# Patient Record
Sex: MALE | Race: WHITE | Employment: FULL TIME | URBAN - METROPOLITAN AREA
[De-identification: names, ages, dates, MRNs, and addresses within clinical notes are randomized per-mention and may not be internally consistent; named-entity substitution may affect disease eponyms.]

---
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
## 2020-08-24 ENCOUNTER — HOSPITAL ENCOUNTER (EMERGENCY)
Age: 20
Discharge: HOME | End: 2020-08-24
Attending: EMERGENCY MEDICINE
Payer: COMMERCIAL

## 2020-08-24 VITALS
DIASTOLIC BLOOD PRESSURE: 86 MMHG | HEART RATE: 98 BPM | TEMPERATURE: 98 F | OXYGEN SATURATION: 97 % | RESPIRATION RATE: 16 BRPM | SYSTOLIC BLOOD PRESSURE: 116 MMHG

## 2020-08-24 PROCEDURE — 2500000003 HC RX 250 WO HCPCS: Performed by: EMERGENCY MEDICINE

## 2020-08-24 PROCEDURE — 12002 RPR S/N/AX/GEN/TRNK2.6-7.5CM: CPT

## 2020-08-24 PROCEDURE — 99282 EMERGENCY DEPT VISIT SF MDM: CPT

## 2020-08-24 RX ORDER — LIDOCAINE HYDROCHLORIDE 10 MG/ML
5 INJECTION, SOLUTION EPIDURAL; INFILTRATION; INTRACAUDAL; PERINEURAL ONCE
Status: COMPLETED | OUTPATIENT
Start: 2020-08-24 | End: 2020-08-24

## 2020-08-24 RX ADMIN — LIDOCAINE HYDROCHLORIDE 5 ML: 10 INJECTION, SOLUTION EPIDURAL; INFILTRATION; INTRACAUDAL; PERINEURAL at 11:35

## 2020-08-24 ASSESSMENT — PAIN DESCRIPTION - DESCRIPTORS: DESCRIPTORS: TENDER

## 2020-08-24 ASSESSMENT — PAIN SCALES - GENERAL: PAINLEVEL_OUTOF10: 3

## 2020-08-24 ASSESSMENT — PAIN DESCRIPTION - LOCATION: LOCATION: FINGER (COMMENT WHICH ONE)

## 2020-08-24 ASSESSMENT — PAIN DESCRIPTION - PAIN TYPE: TYPE: ACUTE PAIN

## 2020-08-24 ASSESSMENT — PAIN DESCRIPTION - FREQUENCY: FREQUENCY: CONTINUOUS

## 2020-08-24 ASSESSMENT — PAIN DESCRIPTION - ONSET: ONSET: GRADUAL

## 2020-08-24 ASSESSMENT — PAIN DESCRIPTION - PROGRESSION: CLINICAL_PROGRESSION: GRADUALLY WORSENING

## 2020-08-24 ASSESSMENT — PAIN DESCRIPTION - ORIENTATION: ORIENTATION: RIGHT

## 2020-08-24 NOTE — ED NOTES
Discharge and education instructions reviewed. Patient verbalized understanding, teach-back successful. Patient denied questions at this time. No acute distress noted. Patient instructed to follow-up as noted - return to emergency department if symptoms worsen. Patient verbalized understanding. Discharged per EDMD with discharge instructions.         Ebonie Sanz RN  08/24/20 6318

## 2020-08-24 NOTE — ED PROVIDER NOTES
[address]  Chief Complaint   Patient presents with    Laceration     pt states he was working and his finger slipped and cut his right pointer finger on sheet metal. pain 3/10     HISTORY OF PRESENT ILLNESS  Daniela Alva is a 23 y.o. male who presents to the ED complaining of laceration to the right index finger on the palmar aspect, transversely along the PIP. No other injuries noted. This happened at work earlier today just prior to arrival where a heavy piece of sheet metal that he was pulling off of a shelf slipped and cut his finger. He denies any other injuries or lacerations or involvement of the other fingers. He was wearing gloves at the time. His tetanus is up-to-date and he is right-hand dominant. He says it was bleeding a lot so he covered it right away and came to the ER for assessment. No other complaints, modifying factors or associated symptoms. Nursing notes reviewed. Past Medical History:   Diagnosis Date    Wrist fracture, left      History reviewed. No pertinent surgical history. History reviewed. No pertinent family history.   Social History     Socioeconomic History    Marital status: Single     Spouse name: Not on file    Number of children: Not on file    Years of education: Not on file    Highest education level: Not on file   Occupational History    Not on file   Social Needs    Financial resource strain: Not on file    Food insecurity     Worry: Not on file     Inability: Not on file    Transportation needs     Medical: Not on file     Non-medical: Not on file   Tobacco Use    Smoking status: Never Smoker    Smokeless tobacco: Never Used   Substance and Sexual Activity    Alcohol use: No     Alcohol/week: 0.0 standard drinks    Drug use: No    Sexual activity: Not on file   Lifestyle    Physical activity     Days per week: Not on file     Minutes per session: Not on file    Stress: Not on file Relationships    Social connections     Talks on phone: Not on file     Gets together: Not on file     Attends [employer] service: Not on file     Active member of club or organization: Not on file     Attends meetings of clubs or organizations: Not on file     Relationship status: Not on file    Intimate partner violence     Fear of current or ex partner: Not on file     Emotionally abused: Not on file     Physically abused: Not on file     Forced sexual activity: Not on file   Other Topics Concern    Not on file   Social History Narrative    Not on file     No current facility-administered medications for this encounter. No current outpatient medications on file. No Known Allergies    REVIEW OF SYSTEMS  6 systems reviewed, pertinent positives per HPI otherwise noted to be negative    PHYSICAL EXAM   /86   Pulse 98   Temp 98 °F (36.7 °C) (Oral)   Resp 16   SpO2 97%    GENERAL APPEARANCE: Awake and alert. Cooperative. No acute distress. HEAD: Normocephalic. Atraumatic. EYES: PERRL. EOM's grossly intact. ENT: Mucous membranes are moist.   NECK: Supple. Normal ROM. CHEST: Equal symmetric chest rise. LUNGS: Breathing is unlabored. Speaking comfortably in full sentences. ABDOMEN: Nondistended, nontender  MUSCULOSKELETAL:  RUE: 3cm transversely oriented linear laceration noted over the palmar aspect of the PIP joint of the second finger. Despite this laceration is full flexion and extension strength when isolated DIP and PIP and MCP joints are evaluated. He has similarly normal motor exams in the other fingers. Distal 2nd fingertip sensation is intact prior to digital block. No other RUE ttp or lacerations. 2+ radial pulse. Brisk cap refill x5 digits. Sensation and motor function fully intact in the radial, ulnar, and median nerve distribution. Full range of motion of all major joints. Cardinal movements of hand fully intact. No erythema, bruising, or other lacerations. Comparments are soft. LUE:  No tenderness. 2+ radial pulse. Brisk cap refill x5 digits. Sensation and motor function fully intact in the radial, ulnar, and median nerve distribution. Full range of motion of all major joints. Cardinal movements of hand fully intact. No erythema, bruising, or lacerations. Comparments are soft. SKIN: Warm and dry. No acute rashes. NEUROLOGICAL: Alert and oriented. Strength is 5/5 in all extremities and sensation is intact. ED COURSE/MDM  The patient's ED course was notable for R index finger laceration. Tetanus is up to date. No signs of tendon injury or distal neurovascular compromise or sensorimotor dysfunction. Finger splint was applied after wound was cleaned and closed. No indication for x-ray at this time or prophylactic antibiotics. Signs and symptoms of infection were discussed and to return to ED if they occurred. No other injuries noted. Patient referred to [facility] for [employer]. as well as hand surgery for wound reevaluation and follow-up. Laceration Repair Procedure Note    Indication: Laceration(s)    Consent: The patient was counseled regarding the procedure, it's indications, risks, potential complications and alternatives and any questions were answered. Consent was obtained. Location: R index finger near PIP joint on palmar side    Shape: linear    Procedure: The patient was placed in the appropriate position and anesthesia around the laceration was obtained by infiltration using 1% Lidocaine without epinephrine. The area was then cleaned with betadine and draped in a sterile fashion. The wound was then cleaned with betadine and irrigated with saline. The wound was fully explored in a bloodless field and no foreign bodies were found. The laceration was closed with 4-0 Ethilon using interrupted sutures. There were no additional lacerations requiring repair. .    The wound area was then dressed with a bandage and finger splint applied    Total repaired wound length: 3 cm. For reference, laceration length cut-offs for billing purposes are  by lengths of 0-2.5cm, 2.6-5cm, 5.1-7.5cm, and 7.6-12.5cm. Wound classification:  simple    Suture/staple count: total suture count 3    The patient tolerated the procedure well. The patients tetanus status was up to date and did not require a booster dose. Complications: None      CLINICAL IMPRESSION  1. Laceration of right index finger without foreign body without damage to nail, initial encounter        Blood pressure 116/86, pulse 98, temperature 98 °F (36.7 °C), temperature source Oral, resp. rate 16, SpO2 97 %. DISPOSITION    I have discussed the findings of today's workup with the patient and addressed the patient's questions and concerns. Important warning signs as well as new or worsening symptoms which would necessitate immediate return to the ED were discussed. The plan is to discharge from the ED at this time, and the patient is in stable condition. The patient acknowledged understanding is agreeable with this plan. Follow-up with:  Daniel Christopher MD  [address]  [address] [place] [address]  [phone]    Schedule an appointment as soon as possible for a visit in 10 days  For suture removal, For wound re-check    *[address] [place] [address]    Schedule an appointment as soon as possible for a visit in 1 week  For worker's comp, For wound re-check    [place] [address]  [phone]  Go to   If symptoms worsen      This chart was created using Dragon dictation software. Efforts were made by me to ensure accuracy, however some errors may be present due to limitations of this technology.         Helen Cota MD  08/24/20 1774

## 2020-08-28 ENCOUNTER — TELEPHONE (OUTPATIENT)
Dept: ORTHOPEDIC SURGERY | Age: 20
End: 2020-08-28

## 2020-10-15 ENCOUNTER — APPOINTMENT (OUTPATIENT)
Dept: GENERAL RADIOLOGY | Age: 20
End: 2020-10-15
Payer: COMMERCIAL

## 2020-10-15 ENCOUNTER — HOSPITAL ENCOUNTER (EMERGENCY)
Age: 20
Discharge: HOME | End: 2020-10-15
Payer: COMMERCIAL

## 2020-10-15 ENCOUNTER — APPOINTMENT (OUTPATIENT)
Dept: CT IMAGING | Age: 20
End: 2020-10-15
Payer: COMMERCIAL

## 2020-10-15 VITALS
OXYGEN SATURATION: 97 % | SYSTOLIC BLOOD PRESSURE: 119 MMHG | HEIGHT: 71 IN | BODY MASS INDEX: 23.67 KG/M2 | HEART RATE: 93 BPM | TEMPERATURE: 97.3 F | DIASTOLIC BLOOD PRESSURE: 65 MMHG | RESPIRATION RATE: 16 BRPM | WEIGHT: 169.09 LBS

## 2020-10-15 PROCEDURE — 73090 X-RAY EXAM OF FOREARM: CPT

## 2020-10-15 PROCEDURE — 99284 EMERGENCY DEPT VISIT MOD MDM: CPT

## 2020-10-15 PROCEDURE — 73200 CT UPPER EXTREMITY W/O DYE: CPT

## 2020-10-15 PROCEDURE — 73110 X-RAY EXAM OF WRIST: CPT

## 2020-10-15 ASSESSMENT — PAIN DESCRIPTION - ORIENTATION: ORIENTATION: LEFT;LOWER

## 2020-10-15 ASSESSMENT — PAIN SCALES - GENERAL
PAINLEVEL_OUTOF10: 7

## 2020-10-15 ASSESSMENT — PAIN DESCRIPTION - LOCATION: LOCATION: ARM;WRIST

## 2020-10-15 ASSESSMENT — PAIN DESCRIPTION - PROGRESSION: CLINICAL_PROGRESSION: NOT CHANGED

## 2020-10-15 ASSESSMENT — PAIN DESCRIPTION - DESCRIPTORS: DESCRIPTORS: ACHING

## 2020-10-15 NOTE — ED NOTES
Initial contact with pt. Pt discharged from ED to home. Pt verbalizes understanding to discharge instructions, teach back successful. Pt denies questions at this time. No acute distress noted. Resp even and unlabored. A/ox4. Pt instructed to follow-up as noted - return to ED if symptoms worsen. Pt verbalizes understanding. Discharged per ED PA with discharge instructions. Pt refuses ambulatory assistance to lobby and walks with steady gait.         Alina Fonseca RN  10/15/20 9208

## 2020-10-15 NOTE — ED PROVIDER NOTES
for dizziness, focal weakness, numbness. Except as noted above the remainder of the review of systems was reviewed and negative. PAST MEDICAL HISTORY         Diagnosis Date    Wrist fracture, left        SURGICAL HISTORY     History reviewed. No pertinent surgical history. CURRENT MEDICATIONS       There are no discharge medications for this patient. ALLERGIES     Patient has no known allergies. FAMILY HISTORY     History reviewed. No pertinent family history. No family status information on file. SOCIAL HISTORY      reports that he has never smoked. He has never used smokeless tobacco. He reports that he does not drink alcohol or use drugs. PHYSICAL EXAM    (up to 7 for level 4, 8 or more for level 5)     ED Triage Vitals [10/15/20 0842]   BP Temp Temp Source Pulse Resp SpO2 Height Weight   119/65 97.3 °F (36.3 °C) Oral 93 16 97 % 5' 11\" (1.803 m) 169 lb 1.5 oz (76.7 kg)       Constitutional:  Appearing well-developed and well-nourished. No distress. HENT:  Normocephalic and atraumatic. Cardiovascular:  Normal rate, regular rhythm, normal heart sounds and intact distal pulses. Pulmonary/Chest:  Effort normal and breath sounds normal. No respiratory distress. Musculoskeletal: Mild swelling and ecchymosis noted to the dorsal left wrist, with tenderness to palpation in this area, including snuffbox tenderness. Normal wrist extension but significant pain reported with attempts at wrist flexion. Normal examination of the remainder of the left hand, but with notably reduced  strength. Normal examination of the left elbow, no bony tenderness, with good range of motion. 2+ radial pulse on the left. Sensation to light touch grossly intact and capillary refill <3 seconds in the digits of the left upper extremity. Neurological:  Oriented to person, place, and time. No cranial nerve deficit. Skin:  Skin is warm and dry. Not diaphoretic.    Psychiatric:  Normal mood, affect, COURSE and DIFFERENTIAL DIAGNOSIS/MDM:   Vitals:    Vitals:    10/15/20 0842   BP: 119/65   Pulse: 93   Resp: 16   Temp: 97.3 °F (36.3 °C)   TempSrc: Oral   SpO2: 97%   Weight: 169 lb 1.5 oz (76.7 kg)   Height: 5' 11\" (1.803 m)       The patient's condition in the ED was good, the patient was afebrile and nontoxic in appearance, and the patient's physical exam was unremarkable other than for the wrist findings noted above. X-ray of the wrist was somewhat indeterminate, so CT without contrast was performed. Scaphoid appeared intact. However, there were some tiny ossific densities dorsal to the triquetrium. Good neurovascular status throughout the extremity. There was no indication for hospitalization or further workup. Given the abnormal findings, however, and the history of surgery in the area, patient will be given a referral for orthopedic follow-up care and encouraged to follow-up promptly. He was given a Velcro wrist splint in the ED and advised to wear it until he is able to follow-up. The patient verbalized understanding and agreement with this plan of care. The patient was advised to return to the emergency department if symptoms should significantly worsen or if new and concerning symptoms should appear. I estimate there is LOW risk for FRACTURE, COMPARTMENT SYNDROME, DEEP VENOUS THROMBOSIS, SEPTIC ARTHRITIS, TENDON OR NEUROVASCULAR INJURY, thus I consider the discharge disposition reasonable. PROCEDURES:  None    FINAL IMPRESSION      1. Left wrist injury, initial encounter    2. History of fracture of arm          DISPOSITION/PLAN   DISPOSITION Decision To Discharge 10/15/2020 11:02:16 AM      PATIENT REFERRED TO:  Abdon Carroll MD  [address] [place] [address]. [address] [place] [address]    Schedule an appointment as soon as possible for a visit   For orthopedic follow-up care      DISCHARGE MEDICATIONS:  There are no discharge medications for this patient.       (Please note that portions of this note were completed with a voice recognition program.  Efforts were made to edit the dictations but occasionally words are mis-transcribed.)    Judith Paul, [id number] [place] [address]  10/15/20 3114